# Patient Record
Sex: MALE | Employment: FULL TIME | URBAN - METROPOLITAN AREA
[De-identification: names, ages, dates, MRNs, and addresses within clinical notes are randomized per-mention and may not be internally consistent; named-entity substitution may affect disease eponyms.]

---

## 2022-08-30 DIAGNOSIS — R35.0 URINARY FREQUENCY: Primary | ICD-10-CM

## 2022-09-06 ENCOUNTER — OFFICE VISIT (OUTPATIENT)
Dept: UROLOGY | Facility: CLINIC | Age: 57
End: 2022-09-06
Payer: COMMERCIAL

## 2022-09-06 VITALS — HEIGHT: 69 IN | BODY MASS INDEX: 40.73 KG/M2 | WEIGHT: 275 LBS

## 2022-09-06 DIAGNOSIS — R35.1 NOCTURIA: ICD-10-CM

## 2022-09-06 DIAGNOSIS — R35.0 URINARY FREQUENCY: Primary | ICD-10-CM

## 2022-09-06 LAB — POC RESIDUAL URINE VOLUME: 0 ML (ref 0–100)

## 2022-09-06 PROCEDURE — 3008F PR BODY MASS INDEX (BMI) DOCUMENTED: ICD-10-PCS | Mod: CPTII,S$GLB,, | Performed by: NURSE PRACTITIONER

## 2022-09-06 PROCEDURE — 51798 POCT BLADDER SCAN: ICD-10-PCS | Mod: S$GLB,,, | Performed by: NURSE PRACTITIONER

## 2022-09-06 PROCEDURE — 4010F ACE/ARB THERAPY RXD/TAKEN: CPT | Mod: CPTII,S$GLB,, | Performed by: NURSE PRACTITIONER

## 2022-09-06 PROCEDURE — 51798 US URINE CAPACITY MEASURE: CPT | Mod: S$GLB,,, | Performed by: NURSE PRACTITIONER

## 2022-09-06 PROCEDURE — 1159F MED LIST DOCD IN RCRD: CPT | Mod: CPTII,S$GLB,, | Performed by: NURSE PRACTITIONER

## 2022-09-06 PROCEDURE — 4010F PR ACE/ARB THEARPY RXD/TAKEN: ICD-10-PCS | Mod: CPTII,S$GLB,, | Performed by: NURSE PRACTITIONER

## 2022-09-06 PROCEDURE — 3008F BODY MASS INDEX DOCD: CPT | Mod: CPTII,S$GLB,, | Performed by: NURSE PRACTITIONER

## 2022-09-06 PROCEDURE — 99204 PR OFFICE/OUTPT VISIT, NEW, LEVL IV, 45-59 MIN: ICD-10-PCS | Mod: S$GLB,,, | Performed by: NURSE PRACTITIONER

## 2022-09-06 PROCEDURE — 1159F PR MEDICATION LIST DOCUMENTED IN MEDICAL RECORD: ICD-10-PCS | Mod: CPTII,S$GLB,, | Performed by: NURSE PRACTITIONER

## 2022-09-06 PROCEDURE — 1160F RVW MEDS BY RX/DR IN RCRD: CPT | Mod: CPTII,S$GLB,, | Performed by: NURSE PRACTITIONER

## 2022-09-06 PROCEDURE — 1160F PR REVIEW ALL MEDS BY PRESCRIBER/CLIN PHARMACIST DOCUMENTED: ICD-10-PCS | Mod: CPTII,S$GLB,, | Performed by: NURSE PRACTITIONER

## 2022-09-06 PROCEDURE — 99204 OFFICE O/P NEW MOD 45 MIN: CPT | Mod: S$GLB,,, | Performed by: NURSE PRACTITIONER

## 2022-09-06 RX ORDER — TRIAMTERENE/HYDROCHLOROTHIAZID 37.5-25 MG
TABLET ORAL
COMMUNITY
Start: 2022-05-03

## 2022-09-06 RX ORDER — GLIMEPIRIDE 2 MG/1
TABLET ORAL
COMMUNITY
Start: 2022-07-21

## 2022-09-06 RX ORDER — TRAMADOL HYDROCHLORIDE 50 MG/1
TABLET ORAL
COMMUNITY
Start: 2022-05-26

## 2022-09-06 RX ORDER — PREDNISONE 10 MG/1
TABLET ORAL
COMMUNITY
Start: 2022-08-24

## 2022-09-06 RX ORDER — METOPROLOL TARTRATE 50 MG/1
TABLET ORAL
COMMUNITY
Start: 2022-06-30

## 2022-09-06 RX ORDER — DAPAGLIFLOZIN 10 MG/1
TABLET, FILM COATED ORAL
COMMUNITY
Start: 2022-08-24

## 2022-09-06 RX ORDER — LOSARTAN POTASSIUM 100 MG/1
TABLET ORAL
COMMUNITY
Start: 2022-08-24

## 2022-09-06 RX ORDER — SEMAGLUTIDE 1.34 MG/ML
INJECTION, SOLUTION SUBCUTANEOUS
COMMUNITY
Start: 2022-07-25

## 2022-09-06 RX ORDER — ROSUVASTATIN CALCIUM 40 MG/1
TABLET, COATED ORAL
COMMUNITY
Start: 2022-07-25

## 2022-09-06 RX ORDER — REPOSITORY CORTICOTROPIN 80 [USP'U]/ML
40 INJECTION INTRAMUSCULAR; SUBCUTANEOUS
COMMUNITY

## 2022-09-06 RX ORDER — COLCHICINE 0.6 MG/1
TABLET ORAL
COMMUNITY
Start: 2022-08-12

## 2022-09-06 RX ORDER — MYCOPHENOLATE MOFETIL 500 MG/1
TABLET ORAL
COMMUNITY
Start: 2022-08-24

## 2022-09-06 RX ORDER — OXYBUTYNIN CHLORIDE 15 MG/1
15 TABLET, EXTENDED RELEASE ORAL DAILY
Qty: 90 TABLET | Refills: 3 | Status: SHIPPED | OUTPATIENT
Start: 2022-09-06 | End: 2022-11-01

## 2022-09-06 RX ORDER — TOPIRAMATE 25 MG/1
TABLET ORAL
COMMUNITY
Start: 2022-08-24

## 2022-09-06 NOTE — PROGRESS NOTES
Subjective:       Patient ID: Aureliano Pat is a 56 y.o. male.    Chief Complaint: Urinary Frequency and Urinary Incontinence      HPI: Male, new to [facility], referred for frequency and nocturia.    Patient states he will get up at least 5 times per night.    Patient states he has frequency throughout the night and during the day.  He states he has a slow stream.    Denies any pain or burning urination.  Denies any odor urine.  Denies any fever or body aches.  Denies any blood in urine.    No other urinary complaints at this time.       Past Medical History:   Past Medical History:   Diagnosis Date    Chronic kidney disease, unspecified     Essential (primary) hypertension     Gout, unspecified     Mixed hyperlipidemia     Rheumatoid arthritis, unspecified     Type 2 diabetes mellitus without complications     Unspecified osteoarthritis, unspecified site     hip    Urinary frequency        Past Surgical Historical:   Past Surgical History:   Procedure Laterality Date    CARPAL TUNNEL RELEASE          Medications:   Medication List with Changes/Refills   New Medications    OXYBUTYNIN (DITROPAN XL) 15 MG TR24    Take 1 tablet (15 mg total) by mouth once daily.   Current Medications    COLCHICINE (COLCRYS) 0.6 MG TABLET        CORTICOTROPIN (CORTROPHIN GEL) 80 UNIT/ML INJECTABLE GEL    40 Units.    FARXIGA 10 MG TABLET        GLIMEPIRIDE (AMARYL) 2 MG TABLET        LOSARTAN (COZAAR) 100 MG TABLET        METOPROLOL TARTRATE (LOPRESSOR) 50 MG TABLET        MYCOPHENOLATE (CELLCEPT) 500 MG TAB        OZEMPIC 0.25 MG OR 0.5 MG(2 MG/1.5 ML) PEN INJECTOR        PREDNISONE (DELTASONE) 10 MG TABLET        ROSUVASTATIN (CRESTOR) 40 MG TAB        TOPIRAMATE (TOPAMAX) 25 MG TABLET        TRAMADOL (ULTRAM) 50 MG TABLET        TRIAMTERENE-HYDROCHLOROTHIAZIDE 37.5-25 MG (MAXZIDE-25) 37.5-25 MG PER TABLET            Past Social History:   Social History     Socioeconomic History    Marital status: Unknown   Tobacco Use     Smoking status: Never    Smokeless tobacco: Current     Types: Snuff   Substance and Sexual Activity    Alcohol use: Not Currently    Drug use: Never       Allergies: Review of patient's allergies indicates:  No Known Allergies     Family History: History reviewed. No pertinent family history.     Review of Systems:  Review of Systems   Constitutional:  Negative for activity change and appetite change.   HENT:  Negative for congestion and dental problem.    Eyes:  Negative for visual disturbance.   Respiratory:  Negative for chest tightness and shortness of breath.    Cardiovascular:  Negative for chest pain.   Gastrointestinal:  Negative for abdominal distention and abdominal pain.   Genitourinary:  Positive for frequency. Negative for decreased urine volume, difficulty urinating, dysuria, enuresis, flank pain, genital sores, hematuria, penile discharge, penile pain, penile swelling, scrotal swelling, testicular pain and urgency.   Musculoskeletal:  Negative for back pain and neck pain.   Skin:  Negative for color change.   Neurological:  Negative for dizziness.   Hematological:  Negative for adenopathy.   Psychiatric/Behavioral:  Negative for agitation, behavioral problems and confusion.      Physical Exam:  Physical Exam  Vitals and nursing note reviewed.   Constitutional:       Appearance: He is well-developed.   HENT:      Head: Normocephalic.   Eyes:      Pupils: Pupils are equal, round, and reactive to light.   Cardiovascular:      Rate and Rhythm: Normal rate and regular rhythm.      Heart sounds: Normal heart sounds.   Pulmonary:      Effort: Pulmonary effort is normal.      Breath sounds: Normal breath sounds.   Abdominal:      General: Bowel sounds are normal.      Palpations: Abdomen is soft.   Musculoskeletal:         General: Normal range of motion.      Cervical back: Normal range of motion and neck supple.   Skin:     General: Skin is warm and dry.   Neurological:      Mental Status: He is alert and  oriented to person, place, and time.   Psychiatric:         Behavior: Behavior normal.     Bladder scan: 0 cc    Assessment/Plan:   Frequency/nocturia:  Patient's bladder scan is 0 cc.    Patient likely has some OAB.  Will start the patient on oxybutynin XL 15 mg daily.    Discussed the effects of tea, soda, and coffee.  Also discussed evening fluids.      Plan follow-up in 6-8 weeks, sooner if needed.  Problem List Items Addressed This Visit    None  Visit Diagnoses       Urinary frequency    -  Primary    Relevant Medications    oxybutynin (DITROPAN XL) 15 MG TR24    Other Relevant Orders    POCT Bladder Scan    Nocturia        Relevant Medications    oxybutynin (DITROPAN XL) 15 MG TR24    Other Relevant Orders    POCT Bladder Scan

## 2022-10-17 ENCOUNTER — CLINICAL SUPPORT (OUTPATIENT)
Dept: UROLOGY | Facility: CLINIC | Age: 57
End: 2022-10-17
Payer: COMMERCIAL

## 2022-10-17 DIAGNOSIS — R39.198 DIFFICULTY VOIDING: ICD-10-CM

## 2022-10-17 DIAGNOSIS — R35.0 URINARY FREQUENCY: Primary | ICD-10-CM

## 2022-10-17 DIAGNOSIS — R35.1 NOCTURIA: ICD-10-CM

## 2022-11-01 ENCOUNTER — OFFICE VISIT (OUTPATIENT)
Dept: UROLOGY | Facility: CLINIC | Age: 57
End: 2022-11-01
Payer: COMMERCIAL

## 2022-11-01 VITALS — SYSTOLIC BLOOD PRESSURE: 170 MMHG | HEART RATE: 98 BPM | DIASTOLIC BLOOD PRESSURE: 87 MMHG

## 2022-11-01 DIAGNOSIS — R35.1 NOCTURIA: ICD-10-CM

## 2022-11-01 DIAGNOSIS — R35.0 URINARY FREQUENCY: Primary | ICD-10-CM

## 2022-11-01 PROCEDURE — 1160F RVW MEDS BY RX/DR IN RCRD: CPT | Mod: CPTII,S$GLB,, | Performed by: NURSE PRACTITIONER

## 2022-11-01 PROCEDURE — 1159F PR MEDICATION LIST DOCUMENTED IN MEDICAL RECORD: ICD-10-PCS | Mod: CPTII,S$GLB,, | Performed by: NURSE PRACTITIONER

## 2022-11-01 PROCEDURE — 99213 PR OFFICE/OUTPT VISIT, EST, LEVL III, 20-29 MIN: ICD-10-PCS | Mod: S$GLB,,, | Performed by: NURSE PRACTITIONER

## 2022-11-01 PROCEDURE — 1159F MED LIST DOCD IN RCRD: CPT | Mod: CPTII,S$GLB,, | Performed by: NURSE PRACTITIONER

## 2022-11-01 PROCEDURE — 99213 OFFICE O/P EST LOW 20 MIN: CPT | Mod: S$GLB,,, | Performed by: NURSE PRACTITIONER

## 2022-11-01 PROCEDURE — 3077F SYST BP >= 140 MM HG: CPT | Mod: CPTII,S$GLB,, | Performed by: NURSE PRACTITIONER

## 2022-11-01 PROCEDURE — 3079F DIAST BP 80-89 MM HG: CPT | Mod: CPTII,S$GLB,, | Performed by: NURSE PRACTITIONER

## 2022-11-01 PROCEDURE — 1160F PR REVIEW ALL MEDS BY PRESCRIBER/CLIN PHARMACIST DOCUMENTED: ICD-10-PCS | Mod: CPTII,S$GLB,, | Performed by: NURSE PRACTITIONER

## 2022-11-01 PROCEDURE — 4010F PR ACE/ARB THEARPY RXD/TAKEN: ICD-10-PCS | Mod: CPTII,S$GLB,, | Performed by: NURSE PRACTITIONER

## 2022-11-01 PROCEDURE — 3077F PR MOST RECENT SYSTOLIC BLOOD PRESSURE >= 140 MM HG: ICD-10-PCS | Mod: CPTII,S$GLB,, | Performed by: NURSE PRACTITIONER

## 2022-11-01 PROCEDURE — 3079F PR MOST RECENT DIASTOLIC BLOOD PRESSURE 80-89 MM HG: ICD-10-PCS | Mod: CPTII,S$GLB,, | Performed by: NURSE PRACTITIONER

## 2022-11-01 PROCEDURE — 4010F ACE/ARB THERAPY RXD/TAKEN: CPT | Mod: CPTII,S$GLB,, | Performed by: NURSE PRACTITIONER

## 2022-11-01 RX ORDER — SOLIFENACIN SUCCINATE 10 MG/1
10 TABLET, FILM COATED ORAL DAILY
Qty: 90 TABLET | Refills: 3 | Status: SHIPPED | OUTPATIENT
Start: 2022-11-01 | End: 2023-11-01

## 2022-11-01 NOTE — PROGRESS NOTES
Subjective:       Patient ID: Aureliano Pat is a 56 y.o. male.    Chief Complaint: Urinary Retention      HPI: [id number], presents for 6 week follow-up.    Patient had presented complaining of frequency and nocturia.  Septic every 2 hours.    We started the patient on oxybutynin XL 15 mg.    Patient states medication provided about a 10% improvement.    Continues to have frequency and nocturia.  Also states he will have a slow stream and dribbling at times.    Patient is aware of the effects of tea, soda, and coffee.    He is decreased fluids before bedtime.      He denies any pain or burning urination.  Denies any odor to the urine.  Denies any fever body aches.  Denies any blood in urine.    No other urinary complaints at this time       Past Medical History:   Past Medical History:   Diagnosis Date    Chronic kidney disease, unspecified     Essential (primary) hypertension     Gout, unspecified     Mixed hyperlipidemia     Rheumatoid arthritis, unspecified     Type 2 diabetes mellitus without complications     Unspecified osteoarthritis, unspecified site     hip    Urinary frequency        Past Surgical Historical:   Past Surgical History:   Procedure Laterality Date    CARPAL TUNNEL RELEASE          Medications:   Medication List with Changes/Refills   New Medications    SOLIFENACIN (VESICARE) 10 MG TABLET    Take 1 tablet (10 mg total) by mouth once daily.   Current Medications    COLCHICINE (COLCRYS) 0.6 MG TABLET        CORTICOTROPIN (CORTROPHIN GEL) 80 UNIT/ML INJECTABLE GEL    40 Units.    FARXIGA 10 MG TABLET        GLIMEPIRIDE (AMARYL) 2 MG TABLET        LOSARTAN (COZAAR) 100 MG TABLET        METOPROLOL TARTRATE (LOPRESSOR) 50 MG TABLET        MYCOPHENOLATE (CELLCEPT) 500 MG TAB        OZEMPIC 0.25 MG OR 0.5 MG(2 MG/1.5 ML) PEN INJECTOR        PREDNISONE (DELTASONE) 10 MG TABLET        ROSUVASTATIN (CRESTOR) 40 MG TAB        TOPIRAMATE (TOPAMAX) 25 MG TABLET        TRAMADOL (ULTRAM) 50 MG TABLET         TRIAMTERENE-HYDROCHLOROTHIAZIDE 37.5-25 MG (MAXZIDE-25) 37.5-25 MG PER TABLET       Discontinued Medications    OXYBUTYNIN (DITROPAN XL) 15 MG TR24    Take 1 tablet (15 mg total) by mouth once daily.        Past Social History:   Social History     Socioeconomic History    Marital status: Unknown   Tobacco Use    Smoking status: Never    Smokeless tobacco: Current     Types: Snuff   Substance and Sexual Activity    Alcohol use: Not Currently    Drug use: Never       Allergies: Review of patient's allergies indicates:  No Known Allergies     Family History: History reviewed. No pertinent family history.     Review of Systems:  Review of Systems   Constitutional:  Negative for activity change and appetite change.   HENT:  Negative for congestion and dental problem.    Respiratory:  Negative for chest tightness and shortness of breath.    Cardiovascular:  Negative for chest pain.   Gastrointestinal:  Negative for abdominal distention and abdominal pain.   Genitourinary:  Positive for frequency. Negative for decreased urine volume, difficulty urinating, dysuria, enuresis, flank pain, genital sores, hematuria, penile discharge, penile pain, penile swelling, scrotal swelling, testicular pain and urgency.   Musculoskeletal:  Negative for back pain and neck pain.   Neurological:  Negative for dizziness.   Hematological:  Negative for adenopathy.   Psychiatric/Behavioral:  Negative for agitation, behavioral problems and confusion.      Physical Exam:  Physical Exam  Vitals and nursing note reviewed.   Constitutional:       Appearance: He is well-developed.   HENT:      Head: Normocephalic.   Cardiovascular:      Rate and Rhythm: Normal rate and regular rhythm.      Heart sounds: Normal heart sounds.   Pulmonary:      Effort: Pulmonary effort is normal.      Breath sounds: Normal breath sounds.   Abdominal:      General: Bowel sounds are normal.      Palpations: Abdomen is soft.   Skin:     General: Skin is warm and dry.    Neurological:      Mental Status: He is alert and oriented to person, place, and time.     Urinalysis:  Glucose 100,000, protein greater than 300, otherwise normal.    Bladder scan:  0 cc    Assessment/Plan:   Frequency/nocturia:  Patient had about 10% improvement with oxybutynin XL 15 mg daily.    Patient will try VESIcare 10 mg daily.    Patient does have glucose some protein in his urine.  States his kidney function is checked regularly by his PCP and rheumatologist.    Has appoint with his PCP in 2 weeks with blood work.    Plan follow-up in 6-8 weeks for re-evaluation, sooner if needed.  Problem List Items Addressed This Visit    None  Visit Diagnoses       Urinary frequency    -  Primary    Relevant Medications    solifenacin (VESICARE) 10 MG tablet    Other Relevant Orders    Basic Metabolic Panel    Nocturia        Relevant Medications    solifenacin (VESICARE) 10 MG tablet    Other Relevant Orders    Basic Metabolic Panel

## 2023-01-11 ENCOUNTER — OFFICE VISIT (OUTPATIENT)
Dept: UROLOGY | Facility: CLINIC | Age: 58
End: 2023-01-11
Payer: COMMERCIAL

## 2023-01-11 VITALS — SYSTOLIC BLOOD PRESSURE: 170 MMHG | HEART RATE: 89 BPM | DIASTOLIC BLOOD PRESSURE: 98 MMHG

## 2023-01-11 DIAGNOSIS — R35.0 URINARY FREQUENCY: Primary | ICD-10-CM

## 2023-01-11 DIAGNOSIS — R39.15 URINARY URGENCY: ICD-10-CM

## 2023-01-11 DIAGNOSIS — R35.1 NOCTURIA: ICD-10-CM

## 2023-01-11 PROCEDURE — 1160F RVW MEDS BY RX/DR IN RCRD: CPT | Mod: CPTII,S$GLB,, | Performed by: NURSE PRACTITIONER

## 2023-01-11 PROCEDURE — 99213 OFFICE O/P EST LOW 20 MIN: CPT | Mod: S$GLB,,, | Performed by: NURSE PRACTITIONER

## 2023-01-11 PROCEDURE — 3077F PR MOST RECENT SYSTOLIC BLOOD PRESSURE >= 140 MM HG: ICD-10-PCS | Mod: CPTII,S$GLB,, | Performed by: NURSE PRACTITIONER

## 2023-01-11 PROCEDURE — 3080F DIAST BP >= 90 MM HG: CPT | Mod: CPTII,S$GLB,, | Performed by: NURSE PRACTITIONER

## 2023-01-11 PROCEDURE — 1159F MED LIST DOCD IN RCRD: CPT | Mod: CPTII,S$GLB,, | Performed by: NURSE PRACTITIONER

## 2023-01-11 PROCEDURE — 3077F SYST BP >= 140 MM HG: CPT | Mod: CPTII,S$GLB,, | Performed by: NURSE PRACTITIONER

## 2023-01-11 PROCEDURE — 1159F PR MEDICATION LIST DOCUMENTED IN MEDICAL RECORD: ICD-10-PCS | Mod: CPTII,S$GLB,, | Performed by: NURSE PRACTITIONER

## 2023-01-11 PROCEDURE — 3080F PR MOST RECENT DIASTOLIC BLOOD PRESSURE >= 90 MM HG: ICD-10-PCS | Mod: CPTII,S$GLB,, | Performed by: NURSE PRACTITIONER

## 2023-01-11 PROCEDURE — 99213 PR OFFICE/OUTPT VISIT, EST, LEVL III, 20-29 MIN: ICD-10-PCS | Mod: S$GLB,,, | Performed by: NURSE PRACTITIONER

## 2023-01-11 PROCEDURE — 1160F PR REVIEW ALL MEDS BY PRESCRIBER/CLIN PHARMACIST DOCUMENTED: ICD-10-PCS | Mod: CPTII,S$GLB,, | Performed by: NURSE PRACTITIONER

## 2023-01-11 NOTE — PROGRESS NOTES
Subjective:       Patient ID: Aureliano Pat is a 57 y.o. male.    Chief Complaint: Bladder Pain      HPI: 57-year-old male, established patient, last seen November 2022.    Patient has been having issues with frequency, urgency, and nocturia.    He was put on oxybutynin with no improvement.  We then tried VESIcare.  Patient states VESIcare is not helping either.    Patient complains of frequency, urgency, and nocturia.  States he gets up every 2 hours at night.  States he has episodes where she gets the bathroom quickly with occasional accidents.      May have some off and on burning with urination.  Denies any odor urine.  Denies any fever or body aches.  Denies any blood in urine.  Denies any significant weight loss.      Patient is diabetic.  He is on medication.  He is also on steroids.      No other urinary complaints at this time.       Past Medical History:   Past Medical History:   Diagnosis Date    Chronic kidney disease, unspecified     Essential (primary) hypertension     Gout, unspecified     Mixed hyperlipidemia     Rheumatoid arthritis, unspecified     Type 2 diabetes mellitus without complications     Unspecified osteoarthritis, unspecified site     hip    Urinary frequency        Past Surgical Historical:   Past Surgical History:   Procedure Laterality Date    CARPAL TUNNEL RELEASE          Medications:   Medication List with Changes/Refills   Current Medications    COLCHICINE (COLCRYS) 0.6 MG TABLET        CORTICOTROPIN (CORTROPHIN GEL) 80 UNIT/ML INJECTABLE GEL    40 Units.    FARXIGA 10 MG TABLET        GLIMEPIRIDE (AMARYL) 2 MG TABLET        LOSARTAN (COZAAR) 100 MG TABLET        METOPROLOL TARTRATE (LOPRESSOR) 50 MG TABLET        MYCOPHENOLATE (CELLCEPT) 500 MG TAB        OZEMPIC 0.25 MG OR 0.5 MG(2 MG/1.5 ML) PEN INJECTOR        PREDNISONE (DELTASONE) 10 MG TABLET        ROSUVASTATIN (CRESTOR) 40 MG TAB        SOLIFENACIN (VESICARE) 10 MG TABLET    Take 1 tablet (10 mg total) by mouth once  daily.    TOPIRAMATE (TOPAMAX) 25 MG TABLET        TRAMADOL (ULTRAM) 50 MG TABLET        TRIAMTERENE-HYDROCHLOROTHIAZIDE 37.5-25 MG (MAXZIDE-25) 37.5-25 MG PER TABLET            Past Social History:   Social History     Socioeconomic History    Marital status: Unknown   Tobacco Use    Smoking status: Never    Smokeless tobacco: Current     Types: Snuff   Substance and Sexual Activity    Alcohol use: Not Currently    Drug use: Never       Allergies: Review of patient's allergies indicates:  No Known Allergies     Family History: History reviewed. No pertinent family history.     Review of Systems:  Review of Systems   Constitutional:  Negative for activity change and appetite change.   HENT:  Negative for congestion and dental problem.    Respiratory:  Negative for chest tightness and shortness of breath.    Cardiovascular:  Negative for chest pain.   Gastrointestinal:  Negative for abdominal distention and abdominal pain.   Genitourinary:  Positive for frequency and urgency. Negative for decreased urine volume, difficulty urinating, dysuria, enuresis, flank pain, genital sores, hematuria, penile discharge, penile pain, penile swelling, scrotal swelling and testicular pain.   Musculoskeletal:  Negative for back pain and neck pain.   Neurological:  Negative for dizziness.   Hematological:  Negative for adenopathy.   Psychiatric/Behavioral:  Negative for agitation, behavioral problems and confusion.      Physical Exam:  Physical Exam  Vitals and nursing note reviewed.   Constitutional:       Appearance: He is well-developed.   HENT:      Head: Normocephalic.   Cardiovascular:      Rate and Rhythm: Normal rate and regular rhythm.      Heart sounds: Normal heart sounds.   Pulmonary:      Effort: Pulmonary effort is normal.      Breath sounds: Normal breath sounds.   Abdominal:      General: Bowel sounds are normal.      Palpations: Abdomen is soft.   Skin:     General: Skin is warm and dry.   Neurological:      Mental  Status: He is alert and oriented to person, place, and time.     Urinalysis: Glucose greater than 1000, protein 100.     Assessment/Plan:   Frequency/urgency/nocturia:  Patient has failed oxybutynin and VESIcare.    Patient provided samples of Gemtesa for 6 weeks.    He will call us notify us if this medication is working.  If it is we will send out prescription.    If medication is not working he will follow-up for other options.      Will tentatively plan follow-up in 6 months, sooner if needed.  Problem List Items Addressed This Visit    None  Visit Diagnoses       Urinary frequency    -  Primary    Relevant Orders    POCT Urinalysis (w/Micro Option)    Urinary urgency        Relevant Orders    POCT Urinalysis (w/Micro Option)    Nocturia        Relevant Orders    POCT Urinalysis (w/Micro Option)

## 2023-04-20 ENCOUNTER — TELEPHONE (OUTPATIENT)
Dept: UROLOGY | Facility: CLINIC | Age: 58
End: 2023-04-20
Payer: COMMERCIAL

## 2023-04-25 ENCOUNTER — PATIENT MESSAGE (OUTPATIENT)
Dept: RESEARCH | Facility: HOSPITAL | Age: 58
End: 2023-04-25
Payer: COMMERCIAL

## 2023-05-02 ENCOUNTER — PATIENT MESSAGE (OUTPATIENT)
Dept: RESEARCH | Facility: HOSPITAL | Age: 58
End: 2023-05-02
Payer: COMMERCIAL